# Patient Record
Sex: FEMALE | Race: WHITE
[De-identification: names, ages, dates, MRNs, and addresses within clinical notes are randomized per-mention and may not be internally consistent; named-entity substitution may affect disease eponyms.]

---
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
## 2019-03-10 ENCOUNTER — HOSPITAL ENCOUNTER (EMERGENCY)
Dept: HOSPITAL 74 - JER | Age: 19
LOS: 1 days | Discharge: HOME | End: 2019-03-11
Payer: COMMERCIAL

## 2019-03-10 VITALS — SYSTOLIC BLOOD PRESSURE: 145 MMHG | DIASTOLIC BLOOD PRESSURE: 79 MMHG | HEART RATE: 142 BPM | TEMPERATURE: 98.8 F

## 2019-03-10 VITALS — BODY MASS INDEX: 35.4 KG/M2

## 2019-03-10 DIAGNOSIS — F41.9: ICD-10-CM

## 2019-03-10 DIAGNOSIS — R00.0: Primary | ICD-10-CM

## 2019-03-10 DIAGNOSIS — R00.2: ICD-10-CM

## 2019-03-10 LAB
AMPHET UR-MCNC: NEGATIVE NG/ML
BARBITURATES UR-MCNC: NEGATIVE NG/ML
BENZODIAZ UR SCN-MCNC: NEGATIVE NG/ML
COCAINE UR-MCNC: NEGATIVE NG/ML
METHADONE UR-MCNC: NEGATIVE NG/ML
OPIATES UR QL SCN: NEGATIVE NG/ML
PCP UR QL SCN: NEGATIVE NG/ML

## 2019-03-10 PROCEDURE — 3E0337Z INTRODUCTION OF ELECTROLYTIC AND WATER BALANCE SUBSTANCE INTO PERIPHERAL VEIN, PERCUTANEOUS APPROACH: ICD-10-PCS

## 2019-03-10 NOTE — PDOC
Attending Attestation





- HPI


HPI: 





The patient is a 18 year old female, with no significant past medical history, 

who presents to the emergency department via EMS for an allergic reaction. As 

per patient, she had an alcoholic beverage which contained coconut liqueur and 

immediately began feeling flushed and had difficulty breathe. As per EMS, 

patient was given Benadryl and Adenosine. 





She denies recent fevers, chills, headache or dizziness. She denies recent 

nausea, vomit, diarrhea or constipation. She denies recent  dysuria, frequency, 

urgency or hematuria. She denies recent chest pain or shortness of breath.





Allergies: Cheese.








- Physicial Exam


PE: 





03/11/19 00:37


GENERAL: 


Well-appearing, well-nourished. No apparent distress.


HEENT: 


Normocephalic, atraumatic. PERRL, EOM intact.


CARDIOVASCULAR: 


+Tachycardic. Normal S1, S2. Regular rhythm.


PULMONARY: 


Clear to auscultation bilaterally.


ABDOMEN: 


Soft, non-distended, non-tender. 


EXTREMITIES: 


Normal ROM in all four extremities. No gross deformities.


SKIN: 


Warm, dry.  No rash


NEUROLOGICAL: 


No focal neurological deficits.








<Raul Murray - Last Filed: 03/11/19 00:36>





- Resident


Resident Name: Albert Saldivar





- ED Attending Attestation


I have performed the following: I have examined & evaluated the patient, The 

case was reviewed & discussed with the resident, I agree w/resident's findings 

& plan, Exceptions are as noted





- Medical Decision Making





03/11/19 01:37


pt received IV benadryl en route


pt has no hives,no respiratory distress-no wheezing,no dypsnea,no chest pain


ekg was sinus tachycardia @ 115





uvula is midline /lungs clear


-pt is comfortable





imp:  food allergy resolved


d/c home











<Nela Mercado - Last Filed: 03/11/19 01:41>





Attestations





- Attestations





03/11/19 00:37





Documentation prepared by Raul Murray, acting as medical scribe for 

Nela Mercado MD.





<Raul Murray - Last Filed: 03/11/19 00:36>

## 2019-03-10 NOTE — PDOC
History of Present Illness





- General


Chief Complaint: Tachycardia


Stated Complaint: POSSIBLE ALLERGIC REACTION


Time Seen by Provider: 03/10/19 22:35


History Source: Patient, EMS, Old Records


Exam Limitations: No Limitations





- History of Present Illness


Initial Comments: 





HPI: 





17 y/o female BIBEMS to [facility] ER complaining of rapid heart rate, palpitations, 

and an indescribable sensation. Reports she was drinking a coconut alcoholic 

drink that her boyfriend made for her at the [employer], where he 

works as a . She felt her throat itch and her face became flushed, so 

an ambulance was called. The EMS crew administered 50mg of Benadryl. Shortly 

after receiving the medication, she developed the palpitations, shortness of 

breath, and the indescribable sensation. Per EMS report, the paramedic 

appreciated SVT on the monitor and administered 6m of Adenosine. The rate 

slowed to sinus tachycardia. No EKG strip was provided by crew.  





At time of interview, pt reports feeling mild palpitations but much improved 

over symptoms in the ambulance. Denies a h/o of similar symptoms. No personal 

or familial history of arrhythmia. No familial history of sudden death or 

unexplained swimming deaths. 





Pt denies any known medication, food, or environmental allergies. 





Pt denies persistent symptoms of breathlessness. Takes OCP. No recent travel or 

periods of immobilization. No leg swelling or tenderness. No personal or 

familial history of blood clots.





Social Hx: 


- EtoH: Drank tonight


- Tobacco: Denies


- Street Drugs: Denies using marijuana in the past several months. Denies any 

additional substances tonight.





Medical Hx: 


- Anxiety, managed with Lexapro. 





Surgical Hx: 


- None











Past History





- Past Medical History


Allergies/Adverse Reactions: 


 Allergies











Allergy/AdvReac Type Severity Reaction Status Date / Time


 


No Known Allergies Allergy   Verified 03/10/19 22:37











Home Medications: 


Ambulatory Orders





Norgestimate-Ethinyl Estradiol [Sprintec 28 Day Tablet] 1 each PO DAILY 01/28/ 18 


Sertraline HCl [Zoloft] 100 mg PO DAILY 01/28/18 


Cephalexin [Keflex] 500 mg PO BID 7 Days #14 capsule 10/18/18 


Ibuprofen 800 mg PO Q8H PRN #20 tablet 10/18/18 


Sulfamethoxazole/Trimethoprim [Bactrim Ds -] 1 tab PO BID #14 tablet 10/18/18 








COPD: No


Psychiatric Problems: Yes (ANXIETY)





- Immunization History


Immunization Up to Date: Yes





- Suicide/Smoking/Psychosocial Hx


Smoking History: Never smoked


Have you smoked in the past 12 months: No


Information on smoking cessation initiated: No


Hx Alcohol Use: Yes (Socially)


Drug/Substance Use Hx: No





**Review of Systems





- Review of Systems


Able to Perform ROS?: Yes


Comments:: 





In addition to that documented in the HPI above, the additional ROS was obtained

:


Constitutional: Denies fevers or chills


Head: Denies vision changes


ENMT: Denies sore throat


CV: Per HPI


Resp: Denies SOB at time of interview


GI: Denies vomiting or diarrhea


: Denies painful urination


MSK: Denies recent trauma


Skin: Denies new rashes


Neuro: Denies new numbness or tingling or weakness


Endocrine: Denies polyuria


Heme: Denies bleeding or bruising











*Physical Exam





- Vital Signs


 Last Vital Signs











Temp Pulse Resp BP Pulse Ox


 


 98.8 F   142 H  27 H  145/79   100 


 


 03/10/19 22:15  03/10/19 22:15  03/10/19 22:15  03/10/19 22:15  03/10/19 22:15














- Physical Exam


Comments: 





Constitutional: Well-developed, well-nourished adult female in no acute 

distress or obvious discomfort. Obese body habitus. Found semi-fowlers on 

hospital bed. Alert and oriented x4. Answered all questions appropriately and 

completely. Speech was non-labored, non-pressured.    





Head: Normocephalic. No obvious external signs of trauma. 





Eyes: PERRL. Sclerae white.. 





Ears: Hearing grossly intact.





Nose: No nasal discharge.





Neck: Supple, trachea is midline. 





Cardiovascular / Chest: Tachycardic rate and regular rhythm.  No murmur, rubs, 

clicks, or gallops. Peripheral pulses: radial pulses full.   


 


Respiratory: Breathing unlabored. Equal chest rise and fall. Clear to 

auscultation bilaterally. No stridor, no wheezing, no rhonchi. 


 


Gastrointestinal: abdomen is soft, non-tender, non-distended.


 


Neuro: Alert and oriented. Moving all four extremities spontaneously. Gait 

normal. Observed walking unassisted through the department without difficulty. 


  


Skin: Warm, dry, and intact. 


 


Psych: Affect: appropriate.  Mood: normal.











Moderate Sedation





- Procedure Monitoring


Vital Signs: 


Procedure Monitoring Vital Signs











Temperature  98.8 F   03/10/19 22:15


 


Pulse Rate  142 H  03/10/19 22:15


 


Respiratory Rate  27 H  03/10/19 22:15


 


Blood Pressure  145/79   03/10/19 22:15


 


O2 Sat by Pulse Oximetry (%)  100   03/10/19 22:15











Medical Decision Making





- Medical Decision Making





EKG Strips sent by Paramedic:


Initial EKG:


Final EKG:








MDM:


*Reviewed vital signs, nursing notes, and prior visit documentation (if 

available).





17 y/o female presenting with tachycardia and resolving palpitations, SOB, and 

indescribable sensation after receiving Benadryl and Adenosine. Triage vitals 

remarkable for tachycardia without hypotension. Sinus tachycardia noted on the 

cardiac monitor. Physical exam as described above. Suspect possible adverse 

reaction to Benadryl. Will obtain UDS and UPreg. Ordered EKG and IVFB. Will 

trend vitals and monitor cardiac activity on continuous telemetry.





12-lead EKG revealed a sinus tachycardia with a ventricular rate of 117 BPM. 

Normal axis. Normal intervals. No ST segment elevation or depression. No delta 

waves. No pathologic Q waves. No signs of Brugada.  





Paramedic was able provide a video of pts EKG. Initial rhythm prior to 

Adenosine was a sinus tachycardia at a rate estimated to be 160-170 bpm. No 

strip showing rhythm change during medication administration. Last strip showed 

a sinus tachycardia at rate estimated to be 120 bpm. (See above)





UDS pan negative. UPreg negative. 





Tachycardia continues to improve. Will continue to monitor.





Pt signed out to resident Dr. Staton after she was verbally appraised of the pts 

HPI, current ED course, and plan of management. Will follow tend of vitals. 











*DC/Admit/Observation/Transfer


Diagnosis at time of Disposition: 


 Tachycardia








- Discharge Dispostion


Condition at time of disposition: Fair





- Referrals





- Patient Instructions





- Post Discharge Activity

## 2019-03-11 NOTE — EKG
Test Reason : 

Blood Pressure : ***/*** mmHG

Vent. Rate : 117 BPM     Atrial Rate : 117 BPM

   P-R Int : 144 ms          QRS Dur : 070 ms

    QT Int : 338 ms       P-R-T Axes : 051 049 019 degrees

   QTc Int : 471 ms

 

SINUS TACHYCARDIA

OTHERWISE NORMAL ECG

NO PREVIOUS ECGS AVAILABLE

Confirmed by DEJON FONSECA MD ([id number]) on 3/11/2019 11:19:53 AM

 

Referred By:             Confirmed By:DEJON FONSECA MD

## 2019-03-11 NOTE — PDOC
*Physical Exam





- Vital Signs


 Last Vital Signs











Temp Pulse Resp BP Pulse Ox


 


 98.8 F   142 H  27 H  145/79   100 


 


 03/10/19 22:15  03/10/19 22:15  03/10/19 22:15  03/10/19 22:15  03/10/19 22:15














ED Treatment Course





- ADDITIONAL ORDERS


Additional order review: 


 Laboratory  Results











  03/10/19 03/10/19





  23:15 23:15


 


Urine HCG, Qual  Negative 


 


Opiates Screen   Negative


 


Methadone Screen   Negative


 


Barbiturate Screen   Negative


 


Phencyclidine Screen   Negative


 


Ur Amphetamines Screen   Negative


 


MDMA (Ecstasy) Screen   Negative


 


Benzodiazepines Screen   Negative


 


Cocaine Screen   Negative


 


U Marijuana (THC) Screen   Negative














- Medications


Given in the ED: 


ED Medications














Discontinued Medications














Generic Name Dose Route Start Last Admin





  Trade Name Italo  PRN Reason Stop Dose Admin


 


Sodium Chloride  1,000 ml  03/10/19 23:00  03/10/19 23:03





  Normal Saline -  IV  03/10/19 23:01  1,000 ml





  ONCE ONE   Administration





     





     





     





     














Medical Decision Making





- Medical Decision Making


Patient signed out by Dr. Saldivar





17yo F with tachycardia. Receiving 1L NS. 


Pending vitals recheck and reassessment. 


03/11/19 00:31





, 1L NS ordered


Updated patient's mother over the phone per patient's request


03/11/19 01:06








Patient feels she is at her baseline


Plan to discharge


03/11/19 01:42








*DC/Admit/Observation/Transfer


Diagnosis at time of Disposition: 


 Tachycardia








- Discharge Dispostion


Condition at time of disposition: Fair





- Referrals





- Patient Instructions


Printed Discharge Instructions:  DI for Tachycardia


Additional Instructions: 


You came into the ED for a high heart rate. Our workup did not indicate acute 

pathology. Other than the high heart rate, your EKG was normal. We gave you 

fluids which improved your symptoms. 





Follow-up with you primary care physician this week to discuss this ED visit 

and to further evaluate your symptoms. Your care is not complete until you do 

so. Call and make an appointment. 





Immediate medical attention is required if: you pass out, have any chest pain, 

shortness of breath, severe headaches, changes in vision, focal numbness or 

weakness, any severe abdominal pain, any black tarry stool, or any new or 

concerning symptoms.  If you think you are having an emergency, call for 

emergency medical services or present to the emergency department right away.





- Post Discharge Activity

## 2021-09-16 NOTE — PDOC
History of Present Illness





- General


Chief Complaint: Abscess Boil


Stated Complaint: PAIN


Time Seen by Provider: 10/18/18 18:54


History Source: Patient


Exam Limitations: Clinical Condition





- History of Present Illness


Initial Comments: 





10/18/18 19:24


Patient with no significant past medical history present with complain of 

swelling and redness to left vulvar which has been worsening the last 3 days. 

Denies vaginal discharge or rash. Patient report shaves her vulvar area. Denies 

any other symptoms


Timing/Duration: other (3 days)





Past History





- Past Medical History


Allergies/Adverse Reactions: 


 Allergies











Allergy/AdvReac Type Severity Reaction Status Date / Time


 


No Known Allergies Allergy   Unverified 10/18/18 18:58











Home Medications: 


Ambulatory Orders





Norgestimate-Ethinyl Estradiol [Sprintec 28 Day Tablet] 1 each PO DAILY 01/28/ 18 


Sertraline HCl [Zoloft] 100 mg PO DAILY 01/28/18 


Cephalexin [Keflex] 500 mg PO BID 7 Days #14 capsule 10/18/18 


Ibuprofen 800 mg PO Q8H PRN #20 tablet 10/18/18 


Sulfamethoxazole/Trimethoprim [Bactrim Ds -] 1 tab PO BID #14 tablet 10/18/18 








COPD: No


Psychiatric Problems: Yes (ANXIETY)





- Immunization History


Immunization Up to Date: Yes





- Suicide/Smoking/Psychosocial Hx


Smoking History: Never smoked


Hx Alcohol Use: No


Drug/Substance Use Hx: No





**Review of Systems





- Review of Systems


Able to Perform ROS?: Yes


Is the patient limited English proficient: No


Constitutional: No: Chills, Fever


Respiratory: No: Symptoms reported


Cardiac (ROS): No: Symptoms Reported


ABD/GI: No: Symptoms Reported


: Yes: Pain (left vulva), Other (left vulva swelling and pain).  No: Burning, 

Dysuria, Discharge, Urgency


Integumentary: Yes: Erythema (left vulva area), Other (swelling to left vulva 

area)





*Physical Exam





- Vital Signs


 Last Vital Signs











Temp Pulse Resp BP Pulse Ox


 


 99.0 F   103 H  18   119/88   100 


 


 10/18/18 18:58  10/18/18 18:58  10/18/18 18:58  10/18/18 18:58  10/18/18 18:58














- Physical Exam


Comments: 





10/18/18 19:30


GENERAL:


Well developed, well nourished. Awake and alert. No acute distress.


CARDIOVASCULAR:


Regular rate and rhythm. No murmurs, rubs, or gallops. Distal pulses are 2+ and 

symmetric. 


PULMONARY: 


No evidence of respiratory distress. Lungs clear to auscultation bilaterally. 

No wheezing, rales or rhonchi.


ABDOMINAL:


Soft. Non-tender. Non-distended. No rebound or guarding. No organomegaly. 

Normoactive bowel sounds. 


SKIN: Moderate erythema to left side of vulva and mons pubis with mild vulva 

swelling. No induration or visible abscess or cyst.  


NEUROLOGICAL: 


Alert, awake, appropriate.  Gait is normal without ataxia.


PSYCHIATRIC: 


Cooperative. Good eye contact. Appropriate mood and affect.


General Appearance: Yes: Nourished, Appropriately Dressed.  No: Apparent 

Distress





Medical Decision Making





- Medical Decision Making





10/18/18 19:32


Patient with no significant past medical history present with complain of 

vulvar pain, swelling and redness. Exam significant for moderate erythema to 

left side of vulvar and mild swelling consistent with cellulitis. No evidence 

of abscess or induration. Patient is stable for outpatient treatment on Keflex 

and Bactrim with Motrin for pain and GYN follow-up





*DC/Admit/Observation/Transfer


Diagnosis at time of Disposition: 


 Vulval cellulitis








- Discharge Dispostion


Disposition: HOME


Condition at time of disposition: Stable


Decision to Admit order: No





- Prescriptions


Prescriptions: 


Cephalexin [Keflex] 500 mg PO BID 7 Days #14 capsule


Ibuprofen 800 mg PO Q8H PRN #20 tablet


 PRN Reason: pain


Sulfamethoxazole/Trimethoprim [Bactrim Ds -] 1 tab PO BID #14 tablet





- Referrals


Referrals: 


Sammy Parson MD [Staff Physician] - 





- Patient Instructions


Printed Discharge Instructions:  Cellulitis


Additional Instructions: 


Take medications as prescribed. Follow up with preferred OB/GYN for 

reassessment as soon as possible 3-4 days





- Post Discharge Activity • Nails 1 through 5 debrided bilaterally.  • We discussed today the use of a disposable emery board when nails are quite after bathing to use this emery board for 3-4 swipes per nail to try to keep the length and thickness nail.  The patient clearly understood that they are not to file the nails when they are dry.  They also understand that there not to do more than 3-4 swipes as this puts that at risk for ulcerating their skin.